# Patient Record
Sex: MALE | Race: WHITE | NOT HISPANIC OR LATINO | Employment: FULL TIME | URBAN - METROPOLITAN AREA
[De-identification: names, ages, dates, MRNs, and addresses within clinical notes are randomized per-mention and may not be internally consistent; named-entity substitution may affect disease eponyms.]

---

## 2017-08-01 ENCOUNTER — HISTORICAL (OUTPATIENT)
Dept: LAB | Facility: HOSPITAL | Age: 27
End: 2017-08-01

## 2017-08-01 LAB
HIV 1+2 AB+HIV1 P24 AG SERPL QL IA: NEGATIVE
RPR SER QL: NORMAL

## 2018-05-03 ENCOUNTER — HISTORICAL (OUTPATIENT)
Dept: ADMINISTRATIVE | Facility: HOSPITAL | Age: 28
End: 2018-05-03

## 2019-09-26 ENCOUNTER — HISTORICAL (OUTPATIENT)
Dept: ADMINISTRATIVE | Facility: HOSPITAL | Age: 29
End: 2019-09-26

## 2019-09-26 ENCOUNTER — HISTORICAL (OUTPATIENT)
Dept: LAB | Facility: HOSPITAL | Age: 29
End: 2019-09-26

## 2019-09-26 LAB
ABS NEUT (OLG): 3.1 X10(3)/MCL (ref 2.1–9.2)
ALBUMIN SERPL-MCNC: 4.9 GM/DL (ref 3.4–5)
ALBUMIN/GLOB SERPL: 2.58 {RATIO} (ref 1.5–2.5)
ALP SERPL-CCNC: 43 UNIT/L (ref 38–126)
ALT SERPL-CCNC: 12 UNIT/L (ref 7–52)
AST SERPL-CCNC: 17 UNIT/L (ref 15–37)
BILIRUB SERPL-MCNC: 0.8 MG/DL (ref 0.2–1)
BILIRUBIN DIRECT+TOT PNL SERPL-MCNC: 0.2 MG/DL (ref 0–0.5)
BILIRUBIN DIRECT+TOT PNL SERPL-MCNC: 0.6 MG/DL
BUN SERPL-MCNC: 14 MG/DL (ref 7–18)
CALCIUM SERPL-MCNC: 9.4 MG/DL (ref 8.5–10)
CHLORIDE SERPL-SCNC: 102 MMOL/L (ref 98–107)
CHOLEST SERPL-MCNC: 166 MG/DL (ref 0–200)
CHOLEST/HDLC SERPL: 2.3 {RATIO}
CO2 SERPL-SCNC: 28 MMOL/L (ref 21–32)
CREAT SERPL-MCNC: 1 MG/DL (ref 0.6–1.3)
ERYTHROCYTE [DISTWIDTH] IN BLOOD BY AUTOMATED COUNT: 13.4 % (ref 11.5–17)
GLOBULIN SER-MCNC: 1.9 GM/DL (ref 1.2–3)
GLUCOSE SERPL-MCNC: 97 MG/DL (ref 74–106)
HCT VFR BLD AUTO: 40.4 % (ref 42–52)
HDLC SERPL-MCNC: 72 MG/DL (ref 35–60)
HGB BLD-MCNC: 14.6 GM/DL (ref 14–18)
HIV 1+2 AB+HIV1 P24 AG SERPL QL IA: NEGATIVE
LDLC SERPL CALC-MCNC: 84 MG/DL (ref 0–129)
LYMPHOCYTES # BLD AUTO: 1.5 X10(3)/MCL (ref 0.6–3.4)
LYMPHOCYTES NFR BLD AUTO: 30.4 % (ref 13–40)
MCH RBC QN AUTO: 32.4 PG (ref 27–31.2)
MCHC RBC AUTO-ENTMCNC: 36 GM/DL (ref 32–36)
MCV RBC AUTO: 90 FL (ref 80–94)
MONOCYTES # BLD AUTO: 0.3 X10(3)/MCL (ref 0.1–1.3)
MONOCYTES NFR BLD AUTO: 6.7 % (ref 0.1–24)
NEUTROPHILS NFR BLD AUTO: 62.9 % (ref 47–80)
PLATELET # BLD AUTO: 173 X10(3)/MCL (ref 130–400)
PMV BLD AUTO: 10 FL (ref 9.4–12.4)
POTASSIUM SERPL-SCNC: 4 MMOL/L (ref 3.5–5.1)
PROT SERPL-MCNC: 6.8 GM/DL (ref 6.4–8.2)
RBC # BLD AUTO: 4.51 X10(6)/MCL (ref 4.7–6.1)
SODIUM SERPL-SCNC: 139 MMOL/L (ref 136–145)
T PALLIDUM AB SER QL: NORMAL
TRIGL SERPL-MCNC: 35 MG/DL (ref 30–150)
TSH SERPL-ACNC: 1.56 MIU/ML (ref 0.35–4.94)
VLDLC SERPL CALC-MCNC: 7 MG/DL
WBC # SPEC AUTO: 4.9 X10(3)/MCL (ref 4.5–11.5)

## 2022-01-21 ENCOUNTER — HISTORICAL (OUTPATIENT)
Dept: ADMINISTRATIVE | Facility: HOSPITAL | Age: 32
End: 2022-01-21

## 2022-01-21 LAB
ABS NEUT (OLG): 1.6 X10(3)/MCL (ref 2.1–9.2)
ALBUMIN SERPL-MCNC: 4.8 GM/DL (ref 3.4–5)
ALBUMIN/GLOB SERPL: 2.09 {RATIO} (ref 1.5–2.5)
ALP SERPL-CCNC: 46 UNIT/L (ref 38–126)
ALT SERPL-CCNC: 17 UNIT/L (ref 7–52)
APPEARANCE, UA: CLEAR
AST SERPL-CCNC: 19 UNIT/L (ref 15–37)
BACTERIA #/AREA URNS AUTO: NORMAL /HPF
BILIRUB SERPL-MCNC: 0.8 MG/DL (ref 0.2–1)
BILIRUB UR QL STRIP: NEGATIVE MG/DL
BILIRUBIN DIRECT+TOT PNL SERPL-MCNC: 0.2 MG/DL (ref 0–0.5)
BILIRUBIN DIRECT+TOT PNL SERPL-MCNC: 0.6 MG/DL
BUN SERPL-MCNC: 14 MG/DL (ref 7–18)
CALCIUM SERPL-MCNC: 9.4 MG/DL (ref 8.5–10.1)
CHLORIDE SERPL-SCNC: 106 MMOL/L (ref 98–107)
CHOLEST SERPL-MCNC: 142 MG/DL (ref 0–200)
CHOLEST/HDLC SERPL: 3.3 {RATIO}
CO2 SERPL-SCNC: 30 MMOL/L (ref 21–32)
COLOR UR: YELLOW
CREAT SERPL-MCNC: 0.98 MG/DL (ref 0.6–1.3)
ERYTHROCYTE [DISTWIDTH] IN BLOOD BY AUTOMATED COUNT: 12.5 % (ref 11.5–17)
EST CREAT CLEARANCE SER (OHS): 141.19 ML/MIN
GLOBULIN SER-MCNC: 2.4 GM/DL (ref 1.2–3)
GLUCOSE (UA): NEGATIVE MG/DL
GLUCOSE SERPL-MCNC: 104 MG/DL (ref 74–106)
HCT VFR BLD AUTO: 43.2 % (ref 42–52)
HDLC SERPL-MCNC: 43 MG/DL (ref 35–60)
HGB BLD-MCNC: 15.1 GM/DL (ref 14–18)
HGB UR QL STRIP: NEGATIVE UNIT/L
KETONES UR QL STRIP: NEGATIVE MG/DL
LDLC SERPL CALC-MCNC: 82 MG/DL (ref 0–129)
LEUKOCYTE ESTERASE UR QL STRIP: NEGATIVE UNIT/L
LYMPHOCYTES # BLD AUTO: 1.7 X10(3)/MCL (ref 0.6–3.4)
LYMPHOCYTES NFR BLD AUTO: 46.7 % (ref 13–40)
MCH RBC QN AUTO: 30.1 PG (ref 27–31.2)
MCHC RBC AUTO-ENTMCNC: 35 GM/DL (ref 32–36)
MCV RBC AUTO: 86 FL (ref 80–94)
MONOCYTES # BLD AUTO: 0.4 X10(3)/MCL (ref 0.1–1.3)
MONOCYTES NFR BLD AUTO: 10.8 % (ref 0.1–24)
NEUTROPHILS NFR BLD AUTO: 42.5 % (ref 47–80)
NITRITE UR QL STRIP.AUTO: NEGATIVE
PH UR STRIP: 7.5 [PH]
PLATELET # BLD AUTO: 180 X10(3)/MCL (ref 130–400)
PMV BLD AUTO: 9.6 FL (ref 9.4–12.4)
POTASSIUM SERPL-SCNC: 5 MMOL/L (ref 3.5–5.1)
PROT SERPL-MCNC: 7.1 GM/DL (ref 6.4–8.2)
PROT UR QL STRIP: NEGATIVE MG/DL
RBC # BLD AUTO: 5.01 X10(6)/MCL (ref 4.7–6.1)
RBC #/AREA URNS HPF: NORMAL /HPF
SODIUM SERPL-SCNC: 142 MMOL/L (ref 136–145)
SP GR UR STRIP: 1.02
SQUAMOUS EPITHELIAL, UA: NORMAL /LPF
TESTOST SERPL-MCNC: 670 NG/DL (ref 300–1060)
TRIGL SERPL-MCNC: 74 MG/DL (ref 30–150)
TSH SERPL-ACNC: 2.06 MIU/ML (ref 0.35–4.94)
UROBILINOGEN UR STRIP-ACNC: 0.2 MG/DL
VLDLC SERPL CALC-MCNC: 14.8 MG/DL
WBC # SPEC AUTO: 3.7 X10(3)/MCL (ref 4.5–11.5)
WBC #/AREA URNS AUTO: NORMAL /[HPF]

## 2022-04-10 ENCOUNTER — HISTORICAL (OUTPATIENT)
Dept: ADMINISTRATIVE | Facility: HOSPITAL | Age: 32
End: 2022-04-10

## 2022-04-25 VITALS
HEIGHT: 70 IN | SYSTOLIC BLOOD PRESSURE: 116 MMHG | BODY MASS INDEX: 28.85 KG/M2 | DIASTOLIC BLOOD PRESSURE: 68 MMHG | WEIGHT: 201.5 LBS

## 2022-05-03 NOTE — HISTORICAL [facility] CERNER
This is a historical note converted from [employer]. Formatting and pictures may have been removed.  Please reference Lory for original formatting and attached multimedia. Chief Complaint  WELLNESS CPX FAST, REQ TESTOSTERONE LEVEL  History of Present Illness  31 yo? male?presents for a well adult exam.  ?   Patient denies any complaints or concerns at this time.  ?   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  ?   PMhx: No chronic problems  ?   Health status:?Good  Exercise:?Routine  Diet:?Regular, healthy; does not eat fast food  Caffeine: daily--2 cups of coffee  ETOH:?1-2 times a year?never  Tobacco use: never  ?   Specialists:?None  ?   Labs: ordered today; fasting  Immunizations: UTD?with flu and Tdap  Colon Ca Screening: Colonoscopy?due at 46  PSA:?Due at 45-50  Dental/Vision:?Both?up-to-date  Review of Systems  Constitutional:?No weight gain, No fever, No chills,?+ fatigue.?  Eyes:?No blurring, No visual disturbances.?  Ear/Nose/Mouth/Throat:?No decreased hearing, + URI symptoms Monday --started feeling better Monday  Respiratory:?No shortness of breath, No cough, No wheezing.?  Cardiovascular:?No chest pain, No palpitations, No peripheral edema.  Gastrointestinal:?No nausea, No vomiting, No diarrhea, No constipation, No abdominal pain.?  Genitourinary:?No dysuria, No hematuria.?No nocturia. ?+?Decreased libido  Hematology/Lymphatics:?No bruising tendency, No bleeding tendency, No swollen lymph glands.?  Endocrine:?No excessive thirst, No polyuria, No excessive hunger.?  Musculoskeletal: No decreased range of motion.?+hips, knee and low back pain?occ. susan. since he was laid off and has been spending more time in bed + hx. of left Achilles tear  Integumentary:?No rash, No pruritus.?  Neurologic:?No abnormal balance, No confusion, No headache.?No insomnia.  Psychiatric:?No anxiety, No depression, Not suicidal, No hallucinations. ?  Physical Exam  Vitals & Measurements  T:?37.1? ?C  (Oral)? HR:?64(Peripheral)? BP:?116/68?  HT:?177?cm? HT:?177.00?cm? WT:?91.4?kg? WT:?91.400?kg? BMI:?29.17?  General:?Alert and oriented, No acute distress.?  Eye:?Normal conjunctiva without exudate.  HENMT:?Normocephalic/AT, Normal hearing, Oral mucosa is moist and pink. ?No nasal exudate. ?No LAD.  Neck:?No goiter visualized.??No nodules palpated. ?No LAD.  Respiratory:?Lungs CTAB, Respirations are non-labored, Breath sounds are equal, Symmetrical chest wall expansion.  Cardiovascular:?Normal rate, Regular rhythm, No murmur, No edema.??Capillary refill less than 3 seconds. ?+2 radial pulse.  Gastrointestinal:?Non-distended.??Soft, nontender to palpation. ?No mass or hernia.  Genitourinary:?No pelvic tenderness.  Musculoskeletal:?Normal ROM, Normal gait, No deformities or amputations.  Integumentary:?Warm, Dry, Intact. No diaphoresis, or flushing.  Neurologic:?No focal deficits, Cranial Nerves II-XII are grossly intact.?  Psychiatric:?Cooperative, Appropriate mood & affect, Normal judgment, Non-suicidal.  Assessment/Plan  1.?Wellness examination?Z00.00  Recommendations:?  ?   Diet (healthy food choices, reduce portions and overall calorie intake)  Exercise 30-45 minutes 3x per week  Avoid excessive alcohol and tobacco  Stay UTD with immunizations and preventative screenings?  ?  Labs ordered.  Ordered:  Clinic Follow-Up Wellness, *Est. 01/21/23 3:00:00 CST, Order for future visit, Wellness examination, [facility]  Comprehensive Metabolic Panel, Routine collect, 01/21/22 8:44:00 CST, Blood, Stop date 01/21/22 8:44:00 CST, Lab Collect, Wellness examination, 01/21/22 8:44:00 CST  Lipid Panel, Routine collect, 01/21/22 8:44:00 CST, Blood, Stop date 01/21/22 8:44:00 CST, Lab Collect, Wellness examination, 01/21/22 8:44:00 CST  Preventative Health Care Est 18-39 years [id number] PC, Wellness examination, [facility], 01/21/22 8:51:00 CST  Thyroid Stimulating Hormone, Routine collect, 01/21/22 8:44:00 CST, Blood, Stop date  01/21/22 8:44:00 CST, Lab Collect, Wellness examination, 01/21/22 8:44:00 CST  Urinalysis no Reflex, Routine collect, Urine, 01/21/22 8:44:00 CST, Stop date 01/21/22 8:44:00 CST, Nurse collect, Wellness examination  ?  2.?Need for influenza vaccination?Z23  ?Consented and given to patient office today  ?  Orders:  Testosterone Level Total, Routine collect, 01/21/22 8:44:00 CST, Blood, Stop date 01/21/22 8:44:00 CST, Lab Collect, Fatigue, 01/21/22 8:44:00 CST  Education and counseling done face to face regarding medical conditions. Call if new symptoms develop. ?Should any symptoms ever significantly worsen, go to ER. Follow up as scheduled yearly for wellness or sooner PRN. Will call with any results. The patient is receptive, expresses understanding and is agreeable to plan. All questions have been answered.?  Referrals  Clinic Follow-Up Wellness, *Est. 01/21/23 3:00:00 CST, Order for future visit, Wellness examination, [facility]   Problem List/Past Medical History  Ongoing  No chronic problems  Historical  No qualifying data  Procedure/Surgical History  Repair Scalp Skin, External Approach (01/21/2021)  Simple repair of superficial wounds of scalp, neck, axillae, external genitalia, trunk and/or extremities (including hands and feet); 2.6 cm to 7.5 cm (01/21/2021)  Extraction of impacted wisdom tooth  LT ACHILLES TEAR REPAIR   Medications  No active medications  Allergies  No Known Medication Allergies  Social History  Abuse/Neglect  No, 01/21/2022  Alcohol  Current, 1-2 times per year, 01/21/2022  Employment/School  Employed, 05/03/2018  Exercise  Exercise duration: 60. Exercise frequency: 3-4 times/week. Exercise type: Aerobics, Weight lifting., 01/21/2022  Home/Environment  Lives with Children, Spouse., 01/21/2022  Nutrition/Health  Regular, 05/03/2018  Substance Use  Past, 05/03/2018  Tobacco  Never (less than 100 in lifetime), N/A, 01/21/2022  Family History  Diabetes mellitus type 2: Father.  Hypertension.:  Father.  Immunizations  Vaccine Date Status   influenza virus vaccine, inactivated 01/21/2022 Given   tetanus/diphtheria/pertussis, acel(Tdap) 01/21/2021 Given   influenza virus vaccine, inactivated 09/26/2019 Given   influenza virus vaccine, inactivated 12/10/2013 Recorded   influenza virus vaccine, inactivated 10/25/2007 Recorded   Health Maintenance  Health Maintenance  ???Pending?(in the next year)  ??? ??OverDue  ??? ? ? ?Influenza Vaccine due??10/01/21??and every 1??day(s)  ??? ??Due?  ??? ? ? ?Alcohol Misuse Screening due??01/02/22??and every 1??year(s)  ??? ? ? ?ADL Screening due??01/21/22??and every 1??year(s)  ??? ??Due In Future?  ??? ? ? ?Obesity Screening not due until??01/01/23??and every 1??year(s)  ???Satisfied?(in the past 1 year)  ??? ??Satisfied?  ??? ? ? ?Blood Pressure Screening on??01/21/22.??Satisfied by Nevin Waggoner LPN  ??? ? ? ?Body Mass Index Check on??01/21/22.??Satisfied by Nevin Waggoner LPN  ??? ? ? ?Depression Screening on??01/21/22.??Satisfied by Nevin Waggoner LPN  ??? ? ? ?Influenza Vaccine on??01/21/22.??Satisfied by Nevin Waggoner LPN  ??? ? ? ?Obesity Screening on??01/21/22.??Satisfied by Nevin Waggoner LPN  ?      Patient condition discussed?in detail with nurse practitioner.? Agree with plan of care?and follow-up.

## 2022-05-03 NOTE — HISTORICAL [facility] CERNER
This is a historical note converted from [employer]. Formatting and pictures may have been removed.  Please reference [employer] for original formatting and attached multimedia. Chief Complaint  DISCUSS STD TESTING, DUE FOR CPX  History of Present Illness  29 year old male presents for annual wellness  No PMH, No Meds  ?  Single, no kid, Works for [employer]  No Tob, EtOH: 1-2 drinks/week  Exercise: cardio/wts 3-4x/week  ?  Wants a STI panel for screening. Denies any complaints, just want screening  Has been with 3 partners in last 6 months (all unprotected).  Review of Systems  Constitutional:?no fever, fatigue, weakness  Eye:?no vision loss, eye redness, drainage, or pain  ENMT:?no sore throat, ear pain, sinus pain/congestion, nasal congestion/drainage  Respiratory:?no cough, no wheezing, no shortness of breath  Cardiovascular:?no chest pain, no palpitations, no edema  Gastrointestinal:?no nausea, vomiting, or diarrhea. No abdominal pain  Genitourinary:?no dysuria, no urinary frequency or urgency, no hematuria  Hema/Lymph:?no abnormal bruising or bleeding  Endocrine:?no heat or cold intolerance, no excessive thirst or excessive urination  Musculoskeletal:?no muscle or joint pain, no joint swelling  Integumentary:?no skin rash or abnormal lesion  Neurologic: no headache, no dizziness, no weakness or numbness  ?  Physical Exam  Vitals & Measurements  T:?37? ?C (Oral)? HR:?60(Peripheral)? BP:?128/78?  HT:?177?cm? WT:?83.9?kg? BMI:?26.78?  General:?well-developed well-nourished in no acute distress  Eye: PERRLA, EOMI, clear conjunctiva, eyelids normal  HENT:?TMs/ear canals clear, oropharynx without erythema/exudate, oropharynx and nasal mucosal surfaces moist, no maxillary/frontal sinus tenderness to palpation  Neck: full range of motion, no thyromegaly or lymphadenopathy  Respiratory:?clear to auscultation bilaterally  Cardiovascular:?regular rate and rhythm without murmurs, gallops or rubs  Gastrointestinal:?soft,  non-tender, non-distended with normal bowel sounds, without masses to palpation  Genitourinary: no CVA tenderness to palpation  Musculoskeletal:?full range of motion of all extremities/spine without limitation or discomfort  Integumentary: no rashes or skin lesions present  Neurologic: cranial nerves intact, no signs of peripheral neurological deficit, motor/sensory function intact  ?  Assessment/Plan  1.?Wellness examination?Z00.00  ?LABS: CBC, CMP, TSH, FLP  Ordered:  CBC w/ Auto Diff, Routine collect, 09/26/19 11:46:00 CDT, Blood, Order for future visit, Stop date 09/26/19 11:46:00 CDT, Lab Collect, Wellness examination, 09/26/19 11:46:00 CDT  Comprehensive Metabolic Panel, Now collect, 09/26/19 11:46:00 CDT, Blood, Order for future visit, Stop date 09/26/19 11:46:00 CDT, Lab Collect, Wellness examination, 09/26/19 11:46:00 CDT  Lipid Panel, Routine collect, 09/26/19 11:46:00 CDT, Blood, Order for future visit, Stop date 09/26/19 11:46:00 CDT, Lab Collect, Wellness examination, 09/26/19 11:46:00 CDT  Preventative Health Care Est 18-39 years [id number] PC, Wellness examination, [facility], 09/26/19 11:46:00 CDT  Thyroid Stimulating Hormone, Routine collect, 09/26/19 11:46:00 CDT, Blood, Order for future visit, Stop date 09/26/19 11:46:00 CDT, Lab Collect, Wellness examination, 09/26/19 11:46:00 CDT  ?  2.?Routine screening for STI (sexually transmitted infection)?Z11.3  ?RPR, HIV, LCX Assay  ?  Orders:  Lab Collection Request, 09/26/19 11:46:00 CDT, [facility], 09/26/19 11:46:00 CDT   Problem List/Past Medical History  Ongoing  No chronic problems  Historical  No qualifying data  Procedure/Surgical History  Extraction of impacted wisdom tooth   Medications  No active medications  Allergies  No Known Medication Allergies  Social History  Abuse/Neglect  No, 09/26/2019  Alcohol  Current, 1-2 times per month, 05/03/2018  Employment/School  Employed, 05/03/2018  Exercise  Exercise duration: 90. Exercise frequency:  5-6 times/week. Exercise type: Running, Weight lifting., 05/03/2018  Home/Environment  Lives with Father, Mother., 05/03/2018  Nutrition/Health  Regular, 05/03/2018  Substance Use  Past, 05/03/2018  Tobacco  Never (less than 100 in lifetime), N/A, 09/26/2019  Family History  Diabetes mellitus type 2: Father.  Immunizations  Vaccine Date Status   influenza virus vaccine, inactivated 09/26/2019 Given   influenza virus vaccine, inactivated 12/10/2013 Recorded   Health Maintenance  Health Maintenance  ???Pending?(in the next year)  ??? ??OverDue  ??? ? ? ?Influenza Vaccine due??and every?  ??? ? ? ?Alcohol Misuse Screening due??01/01/19??and every 1??year(s)  ??? ??Due?  ??? ? ? ?ADL Screening due??09/26/19??and every 1??year(s)  ??? ? ? ?Tetanus Vaccine due??09/26/19??and every 10??year(s)  ??? ??Due In Future?  ??? ? ? ?Obesity Screening not due until??01/01/20??and every 1??year(s)  ??? ? ? ?Blood Pressure Screening not due until??09/25/20??and every 1??year(s)  ??? ? ? ?Body Mass Index Check not due until??09/25/20??and every 1??year(s)  ??? ? ? ?Depression Screening not due until??09/25/20??and every 1??year(s)  ???Satisfied?(in the past 1 year)  ??? ??Satisfied?  ??? ? ? ?Blood Pressure Screening on??09/26/19.??Satisfied by Nevin Waggoner LPN  ??? ? ? ?Body Mass Index Check on??09/26/19.??Satisfied by Nevin Waggoner LPN  ??? ? ? ?Depression Screening on??09/26/19.??Satisfied by Nevin Waggoner LPN  ??? ? ? ?Influenza Vaccine on??09/26/19.??Satisfied by Shruthi Morse CMA  ??? ? ? ?Obesity Screening on??09/26/19.??Satisfied by Nevin Waggoner LPN  ?      Patient condition discussed?in detail with nurse practitioner.? Agree with plan of care?and follow-up.

## 2023-01-27 PROBLEM — Z00.00 WELLNESS EXAMINATION: Status: ACTIVE | Noted: 2023-01-27

## 2023-05-01 PROBLEM — Z00.00 WELLNESS EXAMINATION: Status: RESOLVED | Noted: 2023-01-27 | Resolved: 2023-05-01
